# Patient Record
Sex: FEMALE | Race: WHITE | NOT HISPANIC OR LATINO | URBAN - NONMETROPOLITAN AREA
[De-identification: names, ages, dates, MRNs, and addresses within clinical notes are randomized per-mention and may not be internally consistent; named-entity substitution may affect disease eponyms.]

---

## 2019-05-21 ENCOUNTER — IMPORTED ENCOUNTER (OUTPATIENT)
Dept: URBAN - NONMETROPOLITAN AREA CLINIC 1 | Facility: CLINIC | Age: 64
End: 2019-05-21

## 2019-05-21 PROBLEM — H25.13: Noted: 2019-05-21

## 2019-05-21 PROBLEM — H16.223: Noted: 2019-05-21

## 2019-05-21 PROBLEM — H52.4: Noted: 2019-05-21

## 2019-05-21 PROBLEM — H43.813: Noted: 2019-05-21

## 2019-05-21 PROCEDURE — 92015 DETERMINE REFRACTIVE STATE: CPT

## 2019-05-21 PROCEDURE — 92014 COMPRE OPH EXAM EST PT 1/>: CPT

## 2019-05-21 NOTE — PATIENT DISCUSSION
Hyperopia/Presbyopia OU- Discussed diagnosis in detail with patient - New glasses Rx given today- Continue to monitor- RTC 1 year complete Mild Cataracts OU- Discussed diagnosis in detail with patient- Discussed signs and symptoms of progression- Discussed UV protection- No treatment needed at this time - Continue to monitorDES OU- Discussed diagnosis in detail with patient- Discussed signs and symptoms of progression- Recommend patient drinking plenty of water and starting Omega 3’s - Recommend Refresh or Systane  throughout the day- Continue to monitorPVD OU:  - Discussed findings of exam in detail with the patient. - The risk of retinal detachment in patients with PVDs was discussed with the patient and the warning signs of retinal detachment were carefully reviewed with the patient. - The patient was warned to return to the office or contact the ophthalmologist on call immediately if they experience signs of retinal detachment or changes in vision noted from today.  - Continue to monitor.; 's Notes:   5/21/19DFE  5/21/19

## 2020-05-26 ENCOUNTER — IMPORTED ENCOUNTER (OUTPATIENT)
Dept: URBAN - NONMETROPOLITAN AREA CLINIC 1 | Facility: CLINIC | Age: 65
End: 2020-05-26

## 2020-05-26 PROCEDURE — 92014 COMPRE OPH EXAM EST PT 1/>: CPT

## 2020-05-26 PROCEDURE — 92015 DETERMINE REFRACTIVE STATE: CPT

## 2020-05-26 NOTE — PATIENT DISCUSSION
Hyperopia/Presbyopia OU- Discussed diagnosis in detail with patient - MR done and RX given today. - Continue to monitor- RTC 1 year complete Mild Cataracts OU- Discussed diagnosis in detail with patient- Discussed signs and symptoms of progression- Discussed UV protection- No treatment needed at this time - Continue to monitorDES OU/Allergies - Discussed diagnosis in detail with patient- Discussed signs and symptoms of progression- Recommend patient drinking plenty of water and starting Omega 3’s - Recommend Refresh or Systane  throughout the day- Continue Restasis OU BID patient has only been using QD at this point but stressed to increase back to BID OU. -Start FML BID OU x 1 week rx sent to pharmacy today. - Continue to monitorPVD OU:  - Discussed findings of exam in detail with the patient. - The risk of retinal detachment in patients with PVDs was discussed with the patient and the warning signs of retinal detachment were carefully reviewed with the patient. - The patient was warned to return to the office or contact the ophthalmologist on call immediately if they experience signs of retinal detachment or changes in vision noted from today.  - Continue to monitor.; 's Notes:   5/26/20DFE  5/26/20

## 2021-06-01 ENCOUNTER — IMPORTED ENCOUNTER (OUTPATIENT)
Dept: URBAN - NONMETROPOLITAN AREA CLINIC 1 | Facility: CLINIC | Age: 66
End: 2021-06-01

## 2021-06-01 PROBLEM — H52.4: Noted: 2019-05-21

## 2021-06-01 PROBLEM — H43.813: Noted: 2019-05-21

## 2021-06-01 PROBLEM — H25.813: Noted: 2021-06-01

## 2021-06-01 PROBLEM — H16.223: Noted: 2019-05-21

## 2021-06-01 PROBLEM — H25.812: Noted: 2021-06-01

## 2021-06-01 PROBLEM — H10.423: Noted: 2021-06-01

## 2021-06-01 PROCEDURE — 92014 COMPRE OPH EXAM EST PT 1/>: CPT

## 2021-06-01 PROCEDURE — 92015 DETERMINE REFRACTIVE STATE: CPT

## 2021-06-01 NOTE — PATIENT DISCUSSION
Hyperopia/Presbyopia OU- Discussed diagnosis in detail with patient - Nww glasses RX given today  - Continue to monitor- RTC 1 year complete Mild Cataracts OU- Discussed diagnosis in detail with patient- Discussed signs and symptoms of progression- Discussed UV protection- No treatment needed at this time - Continue to monitorDES OU/Allergies - Discussed diagnosis in detail with patient- Discussed signs and symptoms of progression- Recommend patient drinking plenty of water and starting Omega 3’s - Recommend Refresh or Systane  throughout the day- Continue Restasis OU BID patient has only been using QD at this point but stressed to increase back to BID OU. - Recommend Pataday [place] or Pataday BID OU - Continue to monitorPVD OU:  - Discussed findings of exam in detail with the patient. - The risk of retinal detachment in patients with PVDs was discussed with the patient and the warning signs of retinal detachment were carefully reviewed with the patient. - The patient was warned to return to the office or contact the ophthalmologist on call immediately if they experience signs of retinal detachment or changes in vision noted from today.  - Continue to monitor.; 's Notes:   5/26/20DFE  5/26/20

## 2022-04-15 ASSESSMENT — VISUAL ACUITY
OS_SC: 20/20-1
OS_SC: 20/20
OS_SC: 20/25-
OD_SC: 20/22-2
OD_SC: 20/30
OU_CC: 20/20
OD_SC: 20/25-2

## 2022-04-15 ASSESSMENT — TONOMETRY
OD_IOP_MMHG: 14
OS_IOP_MMHG: 15
OS_IOP_MMHG: 14
OD_IOP_MMHG: 13
OD_IOP_MMHG: 13
OS_IOP_MMHG: 13

## 2022-06-02 ENCOUNTER — COMPREHENSIVE EXAM (OUTPATIENT)
Dept: URBAN - NONMETROPOLITAN AREA CLINIC 1 | Facility: CLINIC | Age: 67
End: 2022-06-02

## 2022-06-02 DIAGNOSIS — H52.4: ICD-10-CM

## 2022-06-02 PROCEDURE — 92015 DETERMINE REFRACTIVE STATE: CPT

## 2022-06-02 PROCEDURE — 92014 COMPRE OPH EXAM EST PT 1/>: CPT

## 2022-06-02 RX ORDER — OLOPATADINE HYDROCHLORIDE 2 MG/ML: 1 SOLUTION OPHTHALMIC TWICE A DAY

## 2022-06-02 ASSESSMENT — TONOMETRY
OD_IOP_MMHG: 14
OS_IOP_MMHG: 14

## 2022-06-02 ASSESSMENT — VISUAL ACUITY
OD_CC: 20/30
OS_CC: 20/20-1

## 2023-06-05 ENCOUNTER — ESTABLISHED PATIENT (OUTPATIENT)
Dept: URBAN - NONMETROPOLITAN AREA CLINIC 1 | Facility: CLINIC | Age: 68
End: 2023-06-05

## 2023-06-05 DIAGNOSIS — H25.813: ICD-10-CM

## 2023-06-05 DIAGNOSIS — H16.223: ICD-10-CM

## 2023-06-05 DIAGNOSIS — G43.B0: ICD-10-CM

## 2023-06-05 DIAGNOSIS — H52.4: ICD-10-CM

## 2023-06-05 PROCEDURE — 92014 COMPRE OPH EXAM EST PT 1/>: CPT

## 2023-06-05 PROCEDURE — 92015 DETERMINE REFRACTIVE STATE: CPT

## 2023-06-05 ASSESSMENT — TONOMETRY
OS_IOP_MMHG: 15
OD_IOP_MMHG: 14

## 2023-06-05 ASSESSMENT — VISUAL ACUITY
OU_CC: 20/20
OS_CC: 20/20
OD_CC: 20/29+2

## 2024-06-10 ENCOUNTER — ESTABLISHED PATIENT (OUTPATIENT)
Dept: URBAN - NONMETROPOLITAN AREA CLINIC 1 | Facility: CLINIC | Age: 69
End: 2024-06-10

## 2024-06-10 DIAGNOSIS — H52.4: ICD-10-CM

## 2024-06-10 PROCEDURE — 92015 DETERMINE REFRACTIVE STATE: CPT

## 2024-06-10 PROCEDURE — 92014 COMPRE OPH EXAM EST PT 1/>: CPT

## 2024-06-10 ASSESSMENT — VISUAL ACUITY
OU_CC: 20/25-1
OS_CC: 20/25-1
OD_CC: 20/50-2

## 2024-06-10 ASSESSMENT — TONOMETRY
OS_IOP_MMHG: 15
OD_IOP_MMHG: 15

## 2024-08-14 ENCOUNTER — CONSULTATION/EVALUATION (OUTPATIENT)
Dept: URBAN - NONMETROPOLITAN AREA CLINIC 1 | Facility: CLINIC | Age: 69
End: 2024-08-14

## 2024-08-14 DIAGNOSIS — H25.813: ICD-10-CM

## 2024-08-14 PROCEDURE — 92134 CPTRZ OPH DX IMG PST SGM RTA: CPT | Mod: NC

## 2024-08-14 PROCEDURE — 92025 CPTRIZED CORNEAL TOPOGRAPHY: CPT | Mod: NC

## 2024-08-14 PROCEDURE — 92136 OPHTHALMIC BIOMETRY: CPT

## 2024-08-14 PROCEDURE — 99214 OFFICE O/P EST MOD 30 MIN: CPT

## 2024-08-14 ASSESSMENT — VISUAL ACUITY
OS_CC: 20/25
OD_CC: 20/50
OD_SC: 20/50-1
OS_BAT: 20/60
OD_PH: 20/40
OD_CC: 20/100
OS_SC: 20/30
OS_CC: 20/40
OS_AM: 20/25
OD_PAM: 20/25
OD_BAT: 20/70

## 2024-08-14 ASSESSMENT — TONOMETRY
OD_IOP_MMHG: 13
OS_IOP_MMHG: 14

## 2024-08-29 ENCOUNTER — PRE-OP/H&P (OUTPATIENT)
Dept: URBAN - NONMETROPOLITAN AREA CLINIC 1 | Facility: CLINIC | Age: 69
End: 2024-08-29

## 2024-08-29 VITALS
BODY MASS INDEX: 27.78 KG/M2 | WEIGHT: 177 LBS | HEIGHT: 67 IN | HEART RATE: 67 BPM | SYSTOLIC BLOOD PRESSURE: 114 MMHG | DIASTOLIC BLOOD PRESSURE: 70 MMHG

## 2024-08-29 DIAGNOSIS — E78.2: ICD-10-CM

## 2024-08-29 DIAGNOSIS — Z01.818: ICD-10-CM

## 2024-08-29 DIAGNOSIS — E07.9: ICD-10-CM

## 2024-08-29 DIAGNOSIS — G47.30: ICD-10-CM

## 2024-08-29 DIAGNOSIS — I48.91: ICD-10-CM

## 2024-08-29 DIAGNOSIS — J44.9: ICD-10-CM

## 2024-08-29 PROCEDURE — 99213 OFFICE O/P EST LOW 20 MIN: CPT

## 2024-09-10 ENCOUNTER — SURGERY/PROCEDURE (OUTPATIENT)
Facility: LOCATION | Age: 69
End: 2024-09-10

## 2024-09-10 DIAGNOSIS — H25.811: ICD-10-CM

## 2024-09-10 DIAGNOSIS — H57.11: ICD-10-CM

## 2024-09-10 PROCEDURE — 68841 INSJ RX ELUT IMPLT LAC CANAL: CPT | Mod: 51,RT

## 2024-09-10 PROCEDURE — 66984CV REMOVE CATARACT, INSERT LENS, CUSTOM VISION

## 2024-09-10 PROCEDURE — 65772LRI LRI DURING CAT SX

## 2024-09-10 PROCEDURE — 66999LNX LENSX

## 2024-09-10 PROCEDURE — 99199PCV PROF CUSTOM VISION PACKAGE

## 2024-09-11 ENCOUNTER — POST OP/EVAL OF SECOND EYE (OUTPATIENT)
Dept: URBAN - NONMETROPOLITAN AREA CLINIC 1 | Facility: CLINIC | Age: 69
End: 2024-09-11

## 2024-09-11 DIAGNOSIS — H25.812: ICD-10-CM

## 2024-09-11 DIAGNOSIS — Z98.41: ICD-10-CM

## 2024-09-11 PROCEDURE — 99024 POSTOP FOLLOW-UP VISIT: CPT

## 2024-09-18 ENCOUNTER — POST-OP (OUTPATIENT)
Dept: URBAN - NONMETROPOLITAN AREA CLINIC 1 | Facility: CLINIC | Age: 69
End: 2024-09-18

## 2024-09-18 DIAGNOSIS — Z98.41: ICD-10-CM

## 2024-09-18 PROCEDURE — 99024 POSTOP FOLLOW-UP VISIT: CPT

## 2024-09-24 ENCOUNTER — SURGERY/PROCEDURE (OUTPATIENT)
Facility: LOCATION | Age: 69
End: 2024-09-24

## 2024-09-24 DIAGNOSIS — H25.812: ICD-10-CM

## 2024-09-24 PROCEDURE — 66999LNX LENSX

## 2024-09-24 PROCEDURE — 68841 INSJ RX ELUT IMPLT LAC CANAL: CPT | Mod: 51,LT,79,LT

## 2024-09-24 PROCEDURE — 65772LRI LRI DURING CAT SX

## 2024-09-24 PROCEDURE — 99199PCV PROF CUSTOM VISION PACKAGE

## 2024-09-24 PROCEDURE — 66984CV REMOVE CATARACT, INSERT LENS, CUSTOM VISION: Mod: 79,LT

## 2024-09-25 ENCOUNTER — POST-OP (OUTPATIENT)
Dept: URBAN - NONMETROPOLITAN AREA CLINIC 1 | Facility: CLINIC | Age: 69
End: 2024-09-25

## 2024-09-25 DIAGNOSIS — Z98.41: ICD-10-CM

## 2024-09-25 DIAGNOSIS — Z98.42: ICD-10-CM

## 2024-09-25 PROCEDURE — 99024 POSTOP FOLLOW-UP VISIT: CPT

## 2024-10-02 ENCOUNTER — POST-OP (OUTPATIENT)
Dept: URBAN - NONMETROPOLITAN AREA CLINIC 1 | Facility: CLINIC | Age: 69
End: 2024-10-02

## 2024-10-02 DIAGNOSIS — Z98.41: ICD-10-CM

## 2024-10-02 DIAGNOSIS — Z98.42: ICD-10-CM

## 2024-10-02 PROCEDURE — 99024 POSTOP FOLLOW-UP VISIT: CPT

## 2024-10-09 ENCOUNTER — POST-OP (OUTPATIENT)
Dept: URBAN - NONMETROPOLITAN AREA CLINIC 1 | Facility: CLINIC | Age: 69
End: 2024-10-09

## 2024-10-09 DIAGNOSIS — Z98.42: ICD-10-CM

## 2024-10-09 DIAGNOSIS — Z98.41: ICD-10-CM

## 2024-10-09 PROCEDURE — 99024 POSTOP FOLLOW-UP VISIT: CPT

## 2024-10-29 ENCOUNTER — POST-OP (OUTPATIENT)
Dept: URBAN - NONMETROPOLITAN AREA CLINIC 1 | Facility: CLINIC | Age: 69
End: 2024-10-29

## 2024-10-29 DIAGNOSIS — Z98.41: ICD-10-CM

## 2024-10-29 DIAGNOSIS — Z98.42: ICD-10-CM

## 2024-10-29 PROCEDURE — 99024 POSTOP FOLLOW-UP VISIT: CPT

## 2025-02-17 ENCOUNTER — FOLLOW UP (OUTPATIENT)
Age: 70
End: 2025-02-17

## 2025-02-17 DIAGNOSIS — H52.4: ICD-10-CM

## 2025-02-17 DIAGNOSIS — Z96.1: ICD-10-CM

## 2025-02-17 PROCEDURE — 99213 OFFICE O/P EST LOW 20 MIN: CPT

## 2025-02-17 PROCEDURE — 92015 DETERMINE REFRACTIVE STATE: CPT
